# Patient Record
Sex: FEMALE | Race: WHITE
[De-identification: names, ages, dates, MRNs, and addresses within clinical notes are randomized per-mention and may not be internally consistent; named-entity substitution may affect disease eponyms.]

---

## 2017-12-11 ENCOUNTER — HOSPITAL ENCOUNTER (OUTPATIENT)
Dept: HOSPITAL 80 - FIMAGING | Age: 77
End: 2017-12-11
Attending: FAMILY MEDICINE
Payer: COMMERCIAL

## 2017-12-11 DIAGNOSIS — Z12.31: Primary | ICD-10-CM

## 2017-12-11 PROCEDURE — G0202 SCR MAMMO BI INCL CAD: HCPCS

## 2017-12-19 ENCOUNTER — HOSPITAL ENCOUNTER (OUTPATIENT)
Dept: HOSPITAL 80 - FIMAGING | Age: 77
End: 2017-12-19
Attending: FAMILY MEDICINE
Payer: COMMERCIAL

## 2017-12-19 DIAGNOSIS — M81.0: ICD-10-CM

## 2017-12-19 DIAGNOSIS — Z79.899: ICD-10-CM

## 2017-12-19 DIAGNOSIS — N20.0: ICD-10-CM

## 2017-12-19 DIAGNOSIS — R91.1: Primary | ICD-10-CM

## 2017-12-19 DIAGNOSIS — E04.1: ICD-10-CM

## 2018-02-03 ENCOUNTER — HOSPITAL ENCOUNTER (OUTPATIENT)
Dept: HOSPITAL 80 - FIMAGING | Age: 78
End: 2018-02-03
Attending: FAMILY MEDICINE
Payer: COMMERCIAL

## 2018-02-03 DIAGNOSIS — Z13.820: Primary | ICD-10-CM

## 2018-02-03 DIAGNOSIS — M81.0: ICD-10-CM

## 2018-03-10 ENCOUNTER — HOSPITAL ENCOUNTER (EMERGENCY)
Dept: HOSPITAL 80 - FED | Age: 78
Discharge: HOME | End: 2018-03-10
Payer: COMMERCIAL

## 2018-03-10 VITALS
OXYGEN SATURATION: 95 % | DIASTOLIC BLOOD PRESSURE: 64 MMHG | TEMPERATURE: 97.5 F | HEART RATE: 59 BPM | RESPIRATION RATE: 14 BRPM | SYSTOLIC BLOOD PRESSURE: 139 MMHG

## 2018-03-10 DIAGNOSIS — L98.9: Primary | ICD-10-CM

## 2018-03-10 LAB — PLATELET # BLD: 162 10^3/UL (ref 150–400)

## 2018-03-10 NOTE — EDPHY
H & P


Stated Complaint: lump left side of neck, pain same location 2 weeks ago


Time Seen by Provider: 03/10/18 10:20


HPI/ROS: 





Chief Complaint: Lump on neck





History of Present Illness: This is a 77 year old female with ongoing thyroid 

issues who presents to the emergency department for evaluation of a nodule on 

her neck. She reports it developed approximately 3 days ago. It is slightly 

tender. She denies precipitating factors. She denies alleviating or aggravating 

factors. No associated signs or symptoms including no fever, no trouble talking 

or swallowing, no trouble breathing. She reports she is flying to the [facility] for a health evaluation next Monday.





Review of Systems: A 10 point review of systems was obtained and other than 

described above was negative.





- Personal History


Current Tetanus/Diphtheria Vaccine: Yes


Current Tetanus Diphtheria and Acellular Pertussis (TDAP): Yes


Tetanus Vaccine Date: 2009





- Medical/Surgical History


Hx Asthma: No


Hx Chronic Respiratory Disease: No


Hx Diabetes: No


Hx Cardiac Disease: No


Hx Renal Disease: No


Hx Cirrhosis: No


Hx Alcoholism: No


Hx HIV/AIDS: No


Hx Splenectomy or Spleen Trauma: No


Other PMH: hypothyroid, osteoporosis.  back pain, foot sx





- Social History


Smoking Status: Never smoked





- Physical Exam


Exam: 





General: Alert, NAD


Eyes: PERRLA.


ENT: Mucous membranes moist. Oropharynx unremarkable. No hoarseness, no drooling

, no trismus, no stridor.


Lungs: CTAB


Cardiac: RRR


Skin: Small mobile lesion noted on the left side of the neck near the thyroid.


Neurological: A&OX4. Strength and sensation intact.


Constitutional: 


 Initial Vital Signs











Temperature (C)  36.6 C   03/10/18 10:05


 


Heart Rate  77   03/10/18 10:05


 


Respiratory Rate  20   03/10/18 10:05


 


Blood Pressure  138/71 H  03/10/18 10:05


 


O2 Sat (%)  96   03/10/18 10:05








 











O2 Delivery Mode               Room Air














Allergies/Adverse Reactions: 


 





codeine [Codeine] Allergy (Verified 03/10/18 10:04)


 


Penicillins Allergy (Verified 03/10/18 10:04)


 








Home Medications: 














 Medication  Instructions  Recorded


 


Acephex    07/09/13


 


Multivitamins [Tab-A-Arpita] 1 each PO DAILY 07/09/13


 


Nulev    07/09/13


 


Vagifem    07/09/13


 


Methimazole  03/10/18














Medical Decision Making





- Diagnostics


Imaging: Discussed imaging studies w/ On call Radiologist


ED Course/Re-evaluation: 





Patient seen under the supervision of my secondary supervising physician Dr. Trever Donald. Patient presents with a neck lesion. She is non toxic. US shows 

benign appearing lesions. She is following up with the [facility] next week 

and will discuss these with her doctor. Return precautions given.


Differential Diagnosis: 





Included but not limited to cyst, abscess, malignancy





- Data Points


Laboratory Results: 


 Laboratory Results





 03/10/18 10:34 





 03/10/18 10:34 











Departure





- Departure


Disposition: Home, Routine, Self-Care


Clinical Impression: 


 Lesion of neck





Condition: Good


Instructions:  Hyperthyroidism (ED)


Additional Instructions: 


Follow-up with your doctors as already arranged





If symptoms worsen or new symptoms develop return to the emergency room for 

recheck


Referrals: 


Nicole Angulo MD [Primary Care Provider] - As per Instructions

## 2018-03-20 ENCOUNTER — HOSPITAL ENCOUNTER (OUTPATIENT)
Dept: HOSPITAL 80 - FIMAGING | Age: 78
End: 2018-03-20
Attending: INTERNAL MEDICINE
Payer: COMMERCIAL

## 2018-03-20 DIAGNOSIS — E05.90: Primary | ICD-10-CM

## 2018-03-20 PROCEDURE — 78014 THYROID IMAGING W/BLOOD FLOW: CPT

## 2018-03-20 PROCEDURE — A9516 IODINE I-123 SOD IODIDE MIC: HCPCS

## 2018-04-03 ENCOUNTER — HOSPITAL ENCOUNTER (OUTPATIENT)
Dept: HOSPITAL 80 - FIMAGING | Age: 78
End: 2018-04-03
Attending: INTERNAL MEDICINE
Payer: COMMERCIAL

## 2018-04-03 DIAGNOSIS — E05.00: Primary | ICD-10-CM

## 2018-04-03 PROCEDURE — A9517 I131 IODIDE CAP, RX: HCPCS

## 2018-04-03 PROCEDURE — 79005 NUCLEAR RX ORAL ADMIN: CPT

## 2018-08-29 ENCOUNTER — HOSPITAL ENCOUNTER (OUTPATIENT)
Dept: HOSPITAL 80 - FIMAGING | Age: 78
End: 2018-08-29
Attending: INTERNAL MEDICINE
Payer: COMMERCIAL

## 2018-08-29 DIAGNOSIS — E03.4: Primary | ICD-10-CM

## 2018-12-14 ENCOUNTER — HOSPITAL ENCOUNTER (EMERGENCY)
Dept: HOSPITAL 80 - FED | Age: 78
Discharge: HOME | End: 2018-12-14
Payer: COMMERCIAL

## 2018-12-14 VITALS — DIASTOLIC BLOOD PRESSURE: 78 MMHG | SYSTOLIC BLOOD PRESSURE: 129 MMHG

## 2018-12-14 DIAGNOSIS — M54.9: ICD-10-CM

## 2018-12-14 DIAGNOSIS — G89.29: ICD-10-CM

## 2018-12-14 DIAGNOSIS — M81.0: ICD-10-CM

## 2018-12-14 DIAGNOSIS — R06.00: Primary | ICD-10-CM

## 2018-12-14 DIAGNOSIS — E03.9: ICD-10-CM

## 2018-12-14 LAB
INR PPP: 0.9 (ref 0.83–1.16)
PLATELET # BLD: 164 10^3/UL (ref 150–400)
PROTHROMBIN TIME: 12.4 SEC (ref 12–15)

## 2018-12-14 NOTE — CPEKG
Test Reason : OPEN

Blood Pressure : ***/*** mmHG

Vent. Rate : 061 BPM     Atrial Rate : 061 BPM

   P-R Int : 146 ms          QRS Dur : 085 ms

    QT Int : 405 ms       P-R-T Axes : 018 008 008 degrees

   QTc Int : 408 ms

 

Sinus rhythm

 

Confirmed by McCollester, Laughlin ([id number]) on 12/14/2018 3:18:44 PM

 

Referred By:             Confirmed By:Laughlin McCollester

## 2018-12-14 NOTE — EDPHY
H & P


Time Seen by Provider: 12/14/18 10:27


HPI/ROS: 





HPI


Shortness of breath.





77-year-old female by private vehicle.  This patient reports that last night 

she was [place] to her car when she noted she felt 

unusually short of breath.  She then reports that this morning while walking 

she also noted that she felt unusually short of breath and felt like she was 

working hard to breathe.  She has not had this sensation before.  She spends a 

good amount of her time up an [place] and does not think it has anything to do 

with the altitude she has not been symptomatic in [place] in the past.  She has 

no history of COPD or reactive airway disease.  She is not a smoker.  Denies 

any cardiac disease history.  She has no associated chest pain.  No cough.  No 

fever.





ROS:





Constitutional:  No fever, no chills.  No weakness.


Eyes:  No discharge.  No changes in vision.


ENT:  No sore throat.  No nasal congestion or rhinorrhea.


Respiratory:  No cough.  As.


Cardiac:  No chest pain, no palpitations.


Gastrointestinal:  No abdominal pain, no vomiting, no diarrhea.


Genitourinary:  No hematuria.  No dysuria or increased frequency with urination.


Musculoskeletal:  No back pain.  No neck pain.  No myalgias or arthralgias.


Skin:  No rashes.


Neurological:  No headache.  No focal weakness or altered sensation.





Past medical history:  Hypothyroid, osteoporosis, back pain chronic, foot 

surgery.





Social history:  Here by herself.  As above.  No alcohol.  Nonsmoker.





Physical Exam:





General Appearance:  Alert, no distress.  This patient is responding to 

questions appropriately and in full sentences.  This patient appears well-

hydrated and well-nourished.


Eyes:  Pupils equal and round no pallor or injection.  No lid edema, erythema 

or injection.


Respiratory:  There are no retractions, lungs are clear to auscultation with 

good air movement bilaterally.


Cardiovascular:  Regular rate and rhythm.  No murmur.


Gastrointestinal:  Abdomen is soft and nontender, no masses, bowel sounds 

normal.  No focal tenderness at McBurney's point.  No Freed sign.


Neurological:  Motor sensory function is grossly intact.  Cranial nerves are 

normal.  Gait is normal.


Skin:  Warm and dry, no rashes.


Musculoskeletal:  Neck is supple and nontender.


Extremities are symmetrical.  No lower extremity edema.  All joints range 

without pain or impingement.


Psychiatric:  No agitation.  No depression.





Database:





EKG:





EKG time is 10:56 a.m.; EKG shows a narrow complex normal sinus rhythm with a 

ventricular rate of 61.  The ME, QRS, QT intervals are within normal limits.  

There are no ST-T wave changes indicative of ischemic or injury pattern.  No 

evidence of right heart strain.  Interpreted by me.





Imaging:





Chest x-ray; the cardiac mediastinal silhouette is unremarkable.  No evidence 

of infiltrate or pneumothorax.  No acute cardiopulmonary disease process noted.

  Interpreted by me.





Procedures:





Emergency department course:





Triage vital signs reviewed.  She is moderately hypertensive.  Vital signs are 

otherwise normal.  Room air pulse oximetry is 96%.  IV was placed.  She was 

placed on a cardiac monitor.  EKG obtained and reviewed by myself.





12:15 p.m., the patient was re-evaluated, resting comfortably at this time.  

Her vital signs have remained normal throughout her emergency department 

course.  Blood pressure is currently 131/72.  Cardiac monitor shows a narrow 

complex sinus rhythm with ventricular rate of 68. Pulse oximetry has been 96% 

to 98% on room air.  She has remained asymptomatic.  I discussed results of her 

emergency department workup.  There have been no red flags.  I discussed 

observation admission overnight.  She does not want to do this.  The patient 

competently engages in shared decision making.  They demonstrate capacitance to 

make decisions.  She feels comfortable going home.  I will have her follow up 

with Cardiology on Monday.  She has seen Dr. Henry in the past.  Return to 

emergency department precautions were thoroughly discussed with her.  All of 

her questions were answered.  She was discharged from the emergency department 

in good condition.





Differential Diagnosis:





The differential diagnosis on this patient includes but is not limited to 

bronchitis, congestive heart failure, acute coronary syndrome, reactive airway 

disease.  This represents a partial list of diagnoses considered.  These 

considerations are based on history, physical exam, past history, reassessment 

and diagnostic testing.


Smoking Status: Never smoked


Constitutional: 


 Initial Vital Signs











Temperature (C)  36.4 C   12/14/18 10:04


 


Heart Rate  68   12/14/18 10:04


 


Respiratory Rate  18   12/14/18 10:04


 


Blood Pressure  165/78 H  12/14/18 10:04


 


O2 Sat (%)  96   12/14/18 10:04








 











O2 Delivery Mode               Room Air














Allergies/Adverse Reactions: 


 





codeine [Codeine] Allergy (Verified 12/14/18 10:06)


 


Penicillins Allergy (Verified 12/14/18 10:06)


 








Home Medications: 














 Medication  Instructions  Recorded


 


Acephex    07/09/13


 


Multivitamins [Tab-A-Arpita] 1 each PO DAILY 07/09/13


 


Nulev    07/09/13


 


Vagifem    07/09/13


 


Methimazole  03/10/18














Medical Decision Making





- Diagnostics


Imaging Results: 


 Imaging Impressions





Chest X-Ray  12/14/18 10:28


Impression: Normal chest x-ray.














- Data Points


Laboratory Results: 


 Laboratory Results





 12/14/18 11:05 





 12/14/18 11:05 





 











  12/14/18 12/14/18 12/14/18





  11:30 11:05 11:05


 


WBC      





    


 


RBC      





    


 


Hgb      





    


 


Hct      





    


 


MCV      





    


 


MCH      





    


 


MCHC      





    


 


RDW      





    


 


Plt Count      





    


 


MPV      





    


 


Neut % (Auto)      





    


 


Lymph % (Auto)      





    


 


Mono % (Auto)      





    


 


Eos % (Auto)      





    


 


Baso % (Auto)      





    


 


Nucleat RBC Rel Count      





    


 


Absolute Neuts (auto)      





    


 


Absolute Lymphs (auto)      





    


 


Absolute Monos (auto)      





    


 


Absolute Eos (auto)      





    


 


Absolute Basos (auto)      





    


 


Absolute Nucleated RBC      





    


 


Immature Gran %      





    


 


Immature Gran #      





    


 


PT      12.4 SEC SEC





     (12.0-15.0) 


 


INR      0.90 





     (0.83-1.16) 


 


APTT      24.3 SEC SEC





     (23.0-38.0) 


 


D-Dimer      0.30 ug/mLFEU ug/mLFEU





     (0.00-0.50) 


 


Sodium    141 mEq/L mEq/L  





    (135-145)  


 


Potassium    4.4 mEq/L mEq/L  





    (3.5-5.2)  


 


Chloride    112 mEq/L H mEq/L  





    ()  


 


Carbon Dioxide    22 mEq/l mEq/l  





    (22-31)  


 


Anion Gap    7 mEq/L mEq/L  





    (6-14)  


 


BUN    18 mg/dL mg/dL  





    (7-23)  


 


Creatinine    0.7 mg/dL mg/dL  





    (0.6-1.0)  


 


Estimated GFR    > 60   





    


 


Glucose    102 mg/dL H mg/dL  





    ()  


 


Calcium    9.6 mg/dL mg/dL  





    (8.5-10.4)  


 


POC Troponin I  0.00 ng/mL ng/mL    





   (0.00-0.08)   


 


NT-Pro-B Natriuret Pep    133 pg/mL pg/mL  





    (0-450)  


 


TSH    2.330 uIU/mL uIU/mL  





    (0.465-4.680)  














  12/14/18





  11:05


 


WBC  5.37 10^3/uL 10^3/uL





   (3.80-9.50) 


 


RBC  5.00 10^6/uL 10^6/uL





   (4.18-5.33) 


 


Hgb  16.1 g/dL g/dL





   (12.6-16.3) 


 


Hct  47.8 % H %





   (38.0-47.0) 


 


MCV  95.6 fL fL





   (81.5-99.8) 


 


MCH  32.2 pg pg





   (27.9-34.1) 


 


MCHC  33.7 g/dL g/dL





   (32.4-36.7) 


 


RDW  13.2 % %





   (11.5-15.2) 


 


Plt Count  164 10^3/uL 10^3/uL





   (150-400) 


 


MPV  11.2 fL fL





   (8.7-11.7) 


 


Neut % (Auto)  61.9 % %





   (39.3-74.2) 


 


Lymph % (Auto)  24.2 % %





   (15.0-45.0) 


 


Mono % (Auto)  10.8 % %





   (4.5-13.0) 


 


Eos % (Auto)  2.2 % %





   (0.6-7.6) 


 


Baso % (Auto)  0.7 % %





   (0.3-1.7) 


 


Nucleat RBC Rel Count  0.0 % %





   (0.0-0.2) 


 


Absolute Neuts (auto)  3.32 10^3/uL 10^3/uL





   (1.70-6.50) 


 


Absolute Lymphs (auto)  1.30 10^3/uL 10^3/uL





   (1.00-3.00) 


 


Absolute Monos (auto)  0.58 10^3/uL 10^3/uL





   (0.30-0.80) 


 


Absolute Eos (auto)  0.12 10^3/uL 10^3/uL





   (0.03-0.40) 


 


Absolute Basos (auto)  0.04 10^3/uL 10^3/uL





   (0.02-0.10) 


 


Absolute Nucleated RBC  0.00 10^3/uL 10^3/uL





   (0-0.01) 


 


Immature Gran %  0.2 % %





   (0.0-1.1) 


 


Immature Gran #  0.01 10^3/uL 10^3/uL





   (0.00-0.10) 


 


PT  





  


 


INR  





  


 


APTT  





  


 


D-Dimer  





  


 


Sodium  





  


 


Potassium  





  


 


Chloride  





  


 


Carbon Dioxide  





  


 


Anion Gap  





  


 


BUN  





  


 


Creatinine  





  


 


Estimated GFR  





  


 


Glucose  





  


 


Calcium  





  


 


POC Troponin I  





  


 


NT-Pro-B Natriuret Pep  





  


 


TSH  





  











Point of Care Test Results: 


 Chemistry











  12/14/18





  11:30


 


POC Troponin I  0.00 ng/mL ng/mL





   (0.00-0.08) 














Departure





- Departure


Disposition: Home, Routine, Self-Care


Clinical Impression: 


 Dyspnea





Condition: Good


Instructions:  Dyspnea (ED)


Additional Instructions: 


Read and follow provided instructions.





Follow-up with your cardiologist, Dr. Henry, at [facility], on Monday as 

discussed.  You should have an echocardiogram arranged through Cardiology to be 

done at that time early next week.





Avoid strenuous physical activity.





Return to the emergency department for worsening symptoms, were worsening 

shortness of breath, chest pain, fever, cough or other serious concerns.


Referrals: 


[facility] [Provider Group] - As per Instructions

## 2019-01-11 ENCOUNTER — HOSPITAL ENCOUNTER (OUTPATIENT)
Dept: HOSPITAL 80 - BHFA | Age: 79
End: 2019-01-11
Attending: INTERNAL MEDICINE
Payer: COMMERCIAL

## 2019-01-11 DIAGNOSIS — R06.02: Primary | ICD-10-CM

## 2019-01-16 ENCOUNTER — HOSPITAL ENCOUNTER (OUTPATIENT)
Dept: HOSPITAL 80 - FIMAGING | Age: 79
End: 2019-01-16
Attending: RADIOLOGY
Payer: COMMERCIAL

## 2019-01-16 DIAGNOSIS — I83.811: Primary | ICD-10-CM

## 2019-01-25 ENCOUNTER — HOSPITAL ENCOUNTER (OUTPATIENT)
Dept: HOSPITAL 80 - FIMAGING | Age: 79
End: 2019-01-25
Attending: FAMILY MEDICINE
Payer: COMMERCIAL

## 2019-01-25 DIAGNOSIS — R06.02: ICD-10-CM

## 2019-01-25 DIAGNOSIS — Z12.31: Primary | ICD-10-CM

## 2019-01-25 DIAGNOSIS — R94.39: ICD-10-CM

## 2019-01-25 PROCEDURE — 78452 HT MUSCLE IMAGE SPECT MULT: CPT

## 2019-01-25 PROCEDURE — 93017 CV STRESS TEST TRACING ONLY: CPT

## 2019-01-25 PROCEDURE — A9500 TC99M SESTAMIBI: HCPCS

## 2019-02-20 ENCOUNTER — HOSPITAL ENCOUNTER (OUTPATIENT)
Dept: HOSPITAL 80 - FIMAGING | Age: 79
Discharge: HOME | End: 2019-02-20
Attending: RADIOLOGY
Payer: COMMERCIAL

## 2019-02-20 VITALS — SYSTOLIC BLOOD PRESSURE: 132 MMHG | DIASTOLIC BLOOD PRESSURE: 65 MMHG

## 2019-02-20 DIAGNOSIS — I83.891: ICD-10-CM

## 2019-02-20 DIAGNOSIS — I83.811: Primary | ICD-10-CM

## 2019-02-20 NOTE — PDRADPN
Radiology Procedure Note


Date of Procedure: 02/20/19


Radiologist: Colleen Pearson


Anesthesia: IV Sedation


Pre-op Diagnosis: RLE VARICOSE VEINS


Post-op Diagnosis: SAME


Indication: PAIN AND SWELLING


Procedure: LASER ABLATION, SCLEROTHERAPY


Inf/Abcess present in the surg proc area at time of surgery?: No

## 2019-02-20 NOTE — PDGENHP
History & Physical


Chief Complaint: RT VARICOSE VEINS


History of Present Illness: PAIN AND SWELLING


Pertinent Past, Social, Family History: LAMINECTOMY, CHOLY, BUNIONECTOMIES, 

CATARACTS. THYROIDS


Relevant Physical Exam: NO PAIN


Cardiorespiratory Assessment: RRR, CTA

## 2019-03-07 ENCOUNTER — HOSPITAL ENCOUNTER (OUTPATIENT)
Dept: HOSPITAL 80 - FIMAGING | Age: 79
End: 2019-03-07
Payer: COMMERCIAL